# Patient Record
Sex: MALE
[De-identification: names, ages, dates, MRNs, and addresses within clinical notes are randomized per-mention and may not be internally consistent; named-entity substitution may affect disease eponyms.]

---

## 2018-05-12 ENCOUNTER — HOSPITAL ENCOUNTER (EMERGENCY)
Dept: HOSPITAL 88 - ER | Age: 53
Discharge: HOME | End: 2018-05-12
Payer: COMMERCIAL

## 2018-05-12 VITALS — SYSTOLIC BLOOD PRESSURE: 140 MMHG | DIASTOLIC BLOOD PRESSURE: 95 MMHG

## 2018-05-12 VITALS — BODY MASS INDEX: 27 KG/M2 | HEIGHT: 66 IN | WEIGHT: 168 LBS

## 2018-05-12 DIAGNOSIS — N20.1: ICD-10-CM

## 2018-05-12 DIAGNOSIS — R10.32: Primary | ICD-10-CM

## 2018-05-12 DIAGNOSIS — R11.0: ICD-10-CM

## 2018-05-12 LAB
ALBUMIN SERPL-MCNC: 4 G/DL (ref 3.5–5)
ALBUMIN/GLOB SERPL: 1.1 {RATIO} (ref 0.8–2)
ALP SERPL-CCNC: 83 IU/L (ref 40–150)
ALT SERPL-CCNC: 30 IU/L (ref 0–55)
ANION GAP SERPL CALC-SCNC: 11.2 MMOL/L (ref 8–16)
BACTERIA URNS QL MICRO: (no result) /HPF
BASOPHILS # BLD AUTO: 0 10*3/UL (ref 0–0.1)
BASOPHILS NFR BLD AUTO: 0.2 % (ref 0–1)
BILIRUB UR QL: NEGATIVE
BUN SERPL-MCNC: 26 MG/DL (ref 7–26)
BUN/CREAT SERPL: 17 (ref 6–25)
CALCIUM SERPL-MCNC: 9.8 MG/DL (ref 8.4–10.2)
CHLORIDE SERPL-SCNC: 101 MMOL/L (ref 98–107)
CLARITY UR: (no result)
CO2 SERPL-SCNC: 30 MMOL/L (ref 22–29)
COLOR UR: YELLOW
DEPRECATED NEUTROPHILS # BLD AUTO: 5 10*3/UL (ref 2.1–6.9)
EGFRCR SERPLBLD CKD-EPI 2021: 49 ML/MIN (ref 60–?)
EOSINOPHIL # BLD AUTO: 0.1 10*3/UL (ref 0–0.4)
EOSINOPHIL NFR BLD AUTO: 0.9 % (ref 0–6)
EPI CELLS URNS QL MICRO: (no result) /LPF
ERYTHROCYTE [DISTWIDTH] IN CORD BLOOD: 13.7 % (ref 11.7–14.4)
GLOBULIN PLAS-MCNC: 3.5 G/DL (ref 2.3–3.5)
GLUCOSE SERPLBLD-MCNC: 145 MG/DL (ref 74–118)
HCT VFR BLD AUTO: 45.1 % (ref 38.2–49.6)
HGB BLD-MCNC: 15.1 G/DL (ref 14–18)
KETONES UR QL STRIP.AUTO: NEGATIVE
LEUKOCYTE ESTERASE UR QL STRIP.AUTO: NEGATIVE
LYMPHOCYTES # BLD: 2.9 10*3/UL (ref 1–3.2)
LYMPHOCYTES NFR BLD AUTO: 34.1 % (ref 18–39.1)
MCH RBC QN AUTO: 30.9 PG (ref 28–32)
MCHC RBC AUTO-ENTMCNC: 33.5 G/DL (ref 31–35)
MCV RBC AUTO: 92.4 FL (ref 81–99)
MONOCYTES # BLD AUTO: 0.5 10*3/UL (ref 0.2–0.8)
MONOCYTES NFR BLD AUTO: 6.3 % (ref 4.4–11.3)
NEUTS SEG NFR BLD AUTO: 58.2 % (ref 38.7–80)
NITRITE UR QL STRIP.AUTO: NEGATIVE
PLATELET # BLD AUTO: 123 X10E3/UL (ref 140–360)
POTASSIUM SERPL-SCNC: 4.2 MMOL/L (ref 3.5–5.1)
PROT UR QL STRIP.AUTO: (no result)
RBC # BLD AUTO: 4.88 X10E6/UL (ref 4.3–5.7)
SODIUM SERPL-SCNC: 138 MMOL/L (ref 136–145)
SP GR UR STRIP: 1.03 (ref 1.01–1.02)
UROBILINOGEN UR STRIP-MCNC: 0.2 MG/DL (ref 0.2–1)
WBC #/AREA URNS HPF: (no result) /HPF (ref 0–5)

## 2018-05-12 PROCEDURE — 99284 EMERGENCY DEPT VISIT MOD MDM: CPT

## 2018-05-12 PROCEDURE — 87086 URINE CULTURE/COLONY COUNT: CPT

## 2018-05-12 PROCEDURE — 36415 COLL VENOUS BLD VENIPUNCTURE: CPT

## 2018-05-12 PROCEDURE — 85025 COMPLETE CBC W/AUTO DIFF WBC: CPT

## 2018-05-12 PROCEDURE — 74176 CT ABD & PELVIS W/O CONTRAST: CPT

## 2018-05-12 PROCEDURE — 80053 COMPREHEN METABOLIC PANEL: CPT

## 2018-05-12 PROCEDURE — 81001 URINALYSIS AUTO W/SCOPE: CPT

## 2018-05-14 ENCOUNTER — HOSPITAL ENCOUNTER (EMERGENCY)
Dept: HOSPITAL 88 - ER | Age: 53
Discharge: HOME | End: 2018-05-14
Payer: COMMERCIAL

## 2018-05-14 VITALS — BODY MASS INDEX: 27 KG/M2 | HEIGHT: 66 IN | WEIGHT: 168 LBS

## 2018-05-14 DIAGNOSIS — R10.9: Primary | ICD-10-CM

## 2018-05-14 DIAGNOSIS — J98.4: ICD-10-CM

## 2018-05-14 DIAGNOSIS — N20.1: ICD-10-CM

## 2018-05-14 DIAGNOSIS — R11.0: ICD-10-CM

## 2018-05-14 LAB
ALBUMIN SERPL-MCNC: 3.8 G/DL (ref 3.5–5)
ALBUMIN/GLOB SERPL: 1 {RATIO} (ref 0.8–2)
ALP SERPL-CCNC: 73 IU/L (ref 40–150)
ALT SERPL-CCNC: 21 IU/L (ref 0–55)
ANION GAP SERPL CALC-SCNC: 11.9 MMOL/L (ref 8–16)
BACTERIA URNS QL MICRO: (no result) /HPF
BASOPHILS # BLD AUTO: 0 10*3/UL (ref 0–0.1)
BASOPHILS NFR BLD AUTO: 0.1 % (ref 0–1)
BILIRUB UR QL: NEGATIVE
BUN SERPL-MCNC: 19 MG/DL (ref 7–26)
BUN/CREAT SERPL: 10 (ref 6–25)
CALCIUM SERPL-MCNC: 9.1 MG/DL (ref 8.4–10.2)
CHLORIDE SERPL-SCNC: 92 MMOL/L (ref 98–107)
CLARITY UR: CLEAR
CO2 SERPL-SCNC: 27 MMOL/L (ref 22–29)
COLOR UR: YELLOW
DEPRECATED NEUTROPHILS # BLD AUTO: 8.5 10*3/UL (ref 2.1–6.9)
DEPRECATED RBC URNS MANUAL-ACNC: (no result) /HPF (ref 0–5)
EGFRCR SERPLBLD CKD-EPI 2021: 39 ML/MIN (ref 60–?)
EOSINOPHIL # BLD AUTO: 0 10*3/UL (ref 0–0.4)
EOSINOPHIL NFR BLD AUTO: 0 % (ref 0–6)
EPI CELLS URNS QL MICRO: (no result) /LPF
ERYTHROCYTE [DISTWIDTH] IN CORD BLOOD: 13.5 % (ref 11.7–14.4)
GLOBULIN PLAS-MCNC: 3.8 G/DL (ref 2.3–3.5)
GLUCOSE SERPLBLD-MCNC: 127 MG/DL (ref 74–118)
HCT VFR BLD AUTO: 41.8 % (ref 38.2–49.6)
HGB BLD-MCNC: 14.4 G/DL (ref 14–18)
KETONES UR QL STRIP.AUTO: NEGATIVE
LEUKOCYTE ESTERASE UR QL STRIP.AUTO: NEGATIVE
LYMPHOCYTES # BLD: 0.5 10*3/UL (ref 1–3.2)
LYMPHOCYTES NFR BLD AUTO: 5.3 % (ref 18–39.1)
LYMPHOCYTES NFR BLD MANUAL: 3 % (ref 19–48)
MCH RBC QN AUTO: 31.2 PG (ref 28–32)
MCHC RBC AUTO-ENTMCNC: 34.4 G/DL (ref 31–35)
MCV RBC AUTO: 90.7 FL (ref 81–99)
MONOCYTES # BLD AUTO: 0.6 10*3/UL (ref 0.2–0.8)
MONOCYTES NFR BLD AUTO: 6.3 % (ref 4.4–11.3)
MONOCYTES NFR BLD MANUAL: 5 % (ref 3.4–9)
MUCOUS THREADS URNS QL MICRO: (no result)
NEUTS SEG NFR BLD AUTO: 87.8 % (ref 38.7–80)
NEUTS SEG NFR BLD MANUAL: 90 % (ref 40–74)
NITRITE UR QL STRIP.AUTO: NEGATIVE
PLAT MORPH BLD: (no result)
PLATELET # BLD AUTO: 103 X10E3/UL (ref 140–360)
PLATELET # BLD EST: (no result) 10*3/UL
POTASSIUM SERPL-SCNC: 3.9 MMOL/L (ref 3.5–5.1)
PROT UR QL STRIP.AUTO: NEGATIVE
RBC # BLD AUTO: 4.61 X10E6/UL (ref 4.3–5.7)
RBC MORPH BLD: NORMAL
SODIUM SERPL-SCNC: 127 MMOL/L (ref 136–145)
SP GR UR STRIP: 1.02 (ref 1.01–1.02)
UROBILINOGEN UR STRIP-MCNC: 0.2 MG/DL (ref 0.2–1)
WBC #/AREA URNS HPF: (no result) /HPF (ref 0–5)

## 2018-05-14 PROCEDURE — 81001 URINALYSIS AUTO W/SCOPE: CPT

## 2018-05-14 PROCEDURE — 99284 EMERGENCY DEPT VISIT MOD MDM: CPT

## 2018-05-14 PROCEDURE — 80053 COMPREHEN METABOLIC PANEL: CPT

## 2018-05-14 PROCEDURE — 36415 COLL VENOUS BLD VENIPUNCTURE: CPT

## 2018-05-14 PROCEDURE — 85025 COMPLETE CBC W/AUTO DIFF WBC: CPT

## 2018-05-14 NOTE — XMS REPORT
Patient Summary Document

 Created on: 2018



ANDERSON GUNN

External Reference #: [id number]

: 1965

Sex: Male



Demographics







 Address  [address] [place] [address]

 

 Home Phone  [phone]

 

 Preferred Language  Unknown

 

 Marital Status  Unknown

 

 Sikhism Affiliation  Unknown

 

 Race  Unknown

 

 Ethnic Group  Unknown





Author







 Author  [facility]

 

 Address  Unknown

 

 Phone  Unavailable







Care Team Providers







 Care Team Member Name  Role  Phone

 

 NIRMALA PARDO  Unavailable  Unavailable







Problems

This patient has no known problems.



Allergies, Adverse Reactions, Alerts

This patient has no known allergies or adverse reactions.



Medications

This patient has no known medications.



Results







 Test Description  Test Time  Test Comments  Text Results  Atomic Results  
Result Comments









 CT ABDOMEN/PELVIS [place] [address]      Patient Name: ANDERSON GUNN   MR #: [id number]    : 1965 Age/Sex: 53/M  Acct #: 
[id number] Req #: [id number]  Adm Physician:     Ordered by: ERIC WELLS 
NP  Report #: [id number]   Location: ER  Room/Bed:     __________________________
_________________________________________________________________________    
Procedure: [id number] CT/CT ABDOMEN/PELVIS WO  Exam Date: 18              
              Exam Time: 1137       REPORT STATUS: Signed    EXAM: CT Abdomen 
and Pelvis WITH contrast     INDICATION: Left flank pain for one hour, nausea   
COMPARISON: None.   TECHNIQUE: Abdomen and pelvis were scanned utilizing a 
multidetector helical   scanner from the lung base to the pubic symphysis after 
administration of IV   contrast. Coronal and sagittal reformations were 
obtained.  Routine protocol is   performed.               IV CONTRAST: None.   
            ORAL CONTRAST: Water               RADIATION DOSE: Total DLP: 
398.35 mGy*cm                Estimated effective dose: (DLP x 0.015 x size 
factor) mSv               COMPLICATIONS: None      FINDINGS:      LINES and 
TUBES: None.      LOWER THORAX:  Unremarkable      HEPATOBILIARY:      No focal 
hepatic lesions. No biliary ductal dilation.       GALLBLADDER: No radio-opaque 
stones or sludge.  No wall thickening.      SPLEEN: No splenomegaly.       
PANCREAS: No focal masses or ductal dilatation.        ADRENALS: No adrenal 
nodules          KIDNEYS/URETERS:      No cystic or solid mass lesions.        
3 mm stone in the left distal ureter (series 3, image 134) with mild left   
hydroureter. No hydronephrosis.      No other renal or ureteral stones on 
either side.      GI TRACT: No abnormal distention, wall thickening, or 
evidence of bowel   obstruction.  Small hiatal hernia.  Sigmoid diverticulosis 
without evidence of   acute diverticulitis.      PELVIC ORGANS/BLADDER: 
Unremarkable.      LYMPH NODES: No lymphadenopathy.      VESSELS: Unremarkable.
      PERITONEUM / RETROPERITONEUM: No free air or fluid. There is fatty 
stranding at   the root of the mesentery with a few prominent mesenteric lymph 
nodes.      BONES: Unremarkable.      SOFT TISSUES: Unremarkable.              
    IMPRESSION:    1.  3 mm stone in the left distal ureter with mild left 
hydroureter.      2.  Fatty stranding at the root of the mesentery ("brayan 
mesentery"   appearance). This is nonspecific and may be due to inflammatory 
conditions   (such as retractile mesenteritis) or lymphoproliferative diseases. 
The clinical   significance is unclear.      Consider follow-up CT of the 
abdomen in 6 months to ensure stability.         Imelda Beltre MD      Signed 
by: Dr. Imelda Beltre M.D. on 2018 11:55 AM        Dictated By: IMELDA BELTRE MD  Electronically Signed By: IMELDA BELTRE MD on 18 4203  
Transcribed By: EDWAR on 18 5882       COPY TO:   ERIC WELLS NP

## 2018-05-14 NOTE — XMS REPORT
Continuity of Care Document

 Created on: 2018



ANDERSON ADAMES

External Reference #: [id number]

: 1965

Sex: Male



Demographics







 [place]  [address]

 

 Home Phone  [phone]

 

 Preferred Language  Unknown

 

 Marital Status  Unknown

 

 Judaism Affiliation  Unknown

 

 Race  Other

 

 Ethnic Group  Unknown





Author







 Author  [facility]

 

 Organization  [facility]

 

 Address  [address]

[place]  [address]



 

 Phone  Unavailable







Support







 Name  Relationship  Address  Phone

 

 NIRMALA PARDO MD  Caregiver  [address]

[place]  [address]  Unavailable

 

 NO, PCP  Caregiver  Unknown  Unavailable

 

 MD ADRIA  Caregiver  Unknown  Unavailable

 

 CAMILLA ADAMES  Next Of Kin   [place], [place]  [id number] [phone]







Care Team Providers







 Care Team Member Name  Role  Phone

 

 NO, PCP  PCP  Unavailable







Insurance Providers







 Guarantor  Anderson Adames

 

 Address   [place] [id number]

 

 Phone  [phone]

 

 Email  [email or web address]











 Payer  [employer]

 

 Policy Number  [id number]

 

 Subscriber's Name  Anderson Adames

 

 Relationship  18 Self / Same As Patient

 

 Group Number  [id number]

 

 Group Name  [employer]

 

 Effective Date  11







Advance Directives







 Directive  Response  Recorded Date/Time

 

 Does the patient have an advance directive?  No  13 10:48pm

 

 If yes, is advance directive on file with [facility]?  No  13 10:48pm

 

 If not on file with [facility] will patient provide a copy?  No  13 10:48pm

 

 Do you have a Directive to Physician?  No  18 11:36am

 

 Do you have a Medical Power of ?  No  18 11:36am

 

 Do you have an out of hospital Do Not Resuscitate Order?  No  18 11:36am

 

 Do you have any special needs we should be aware of?  No  18 11:36am

 

 Do you have a support person here with you today?  Yes  18 11:36am

 

 Did patient receive Notice of Privacy Practices?  Yes  18 11:36am

 

 Did patient receive patient rights and responsibilities?  Yes  18 11:36am







Problems

No problem information available.



Medications





Current Home Medications





 Medication  Dose  Units  Route  Directions  Days  Qty  Instructions  Start Date

 

 Atorvastatin Calcium (Lipitor) 20 Mg Tablet  1  Tab  Oral  Rt Daily            







Social History







 Smoking Status  Start Date  Stop Date

 

 Never Smoker      







Hospital Discharge Instructions

No hospital discharge instruction information available.



Plan of Care







 Discharge Date  18 2:12pm

 

 Disposition  HOME, SELF-CARE

 

 Condition at Discharge  Stable

 

 Instructions/Education Provided  Kidney Stones

 

 Forms Provided  Work/School Excuse

 

 Prescriptions  See Medication Section

 

 Referrals  ENRIQUE GILES MD

Address:

 [facility] [place] [address] [phone]

 

 Additional Instructions/Education  1. increase oral fluids



 2. return to ed as needed



 3. follow up with urologist in 1-2 days without fail









Functional Status

No functional status information available.



Allergies, Adverse Reactions, Alerts

No known allergies.



Immunizations

No immunization information available.



Vital Signs





Acute Vital Signs





 Vital  Response  Date/Time

 

 Pulse      

 

    Pulse Rate (adult)  65 bpm (60 - 90)  2018 2:10pm

 

 Respiratory Rate  20 bpm (12 - 24)  2018 2:10pm

 

 Blood Pressure  140/95 mm Hg  2018 2:10pm

 

 Height  5 ft 6 in  2018 11:10am

 

 Weight  168 lb  2018 11:10am

 

 Body Mass Index  27.1 kg/m^2  2018 11:10am







Results





Laboratory Results





 Test Name  Result  Units  Flags  Reference  Collection Date/Time  Result Date/
Time  Comments

 

 White Blood Count  8.58  x10e3/uL     4.8-10.8  2018 11:45am  2018 
11:57am   

 

 Red Blood Count  4.88  x10e6/uL     4.3-5.7  2018 11:45am  2018 11:
57am   

 

 Hemoglobin  15.1  g/dL     14.0-18.0  2018 11:45am  2018 11:57am   

 

 Hematocrit  45.1  %     38.2-49.6  2018 11:45am  2018 11:57am   

 

 Mean Corpuscular Volume  92.4  fL     81-99  2018 11:45am  2018 11:
57am   

 

 Mean Corpuscular Hemoglobin  30.9  pg     28-32  2018 11:452018 11:57am   

 

 Mean Corpuscular Hemoglobin Concent  33.5  g/dL     31-35  2018 11:452018 11:57am   

 

 Red Cell Distribution Width  13.7  %     11.7-14.4  2018 11:452018 11:57am   

 

 Platelet Count  123  x10e3/uL   L  140-360  2018 11:452018 11:
57am   

 

 Neutrophils (%) (Auto)  58.2  %     38.7-80.0  2018 11:45am  2018 
11:57am   

 

 Lymphocytes (%) (Auto)  34.1  %     18.0-39.1  2018 11:452018 
11:57am   

 

 Monocytes (%) (Auto)  6.3   %     4.4-11.3  2018 11:452018 11:
57am   

 

 Eosinophils (%) (Auto)  0.9  %     0.0-6.0  2018 11:452018 11:
57am   

 

 Basophils (%) (Auto)  0.2  %     0.0-1.0  2018 11:452018 11:
57am   

 

 IM GRANULOCYTES %  0.3  %     0.0-1.0  2018 11:452018 11:57am 
  

 

 Neutrophils # (Auto)  5.0        2.1-6.9  2018 11:452018 11:
57am   

 

 Lymphocytes # (Auto)  2.9        1.0-3.2  2018 11:45am  2018 11:
57am   

 

 Monocytes # (Auto)  0.5        0.2-0.8  2018 11:452018 11:57am
   

 

 Eosinophils # (Auto)  0.1        0.0-0.4  2018 11:452018 11:
57am   

 

 Basophils # (Auto)  0.0        0.0-0.1  2018 11:45am  2018 11:57am
   

 

 Absolute Immature Granulocyte (auto  0.03  x10e3/uL     0-0.1  2018 11:
45am  2018 11:57am   

 

 Urine Color  YELLOW        YELLOW  2018 11:12am  2018 12:02pm   

 

 Urine Clarity  HAZY        CLEAR  2018 11:122018 12:02pm   

 

 Urine Specific Gravity  1.030      H  1.010-1.025  2018 11:12am  2018 12:02pm   

 

 Urine pH  6        5 - 7  2018 11:122018 12:02pm   

 

 Urine Leukocyte Esterase  NEGATIVE        NEGATIVE  2018 11:12am  2018 12:02pm   

 

 Urine Nitrite  NEGATIVE        NEGATIVE  2018 11:12am  2018 12:
02pm   

 

 Urine Protein  TRACE      H  NEGATIVE  2018 11:12am  2018 12:02pm 
  

 

 Urine Glucose (UA)  NEGATIVE        NEGATIVE  2018 11:12am  2018 12
:02pm   

 

 Urine Ketones  NEGATIVE        NEGATIVE  2018 11:12am  2018 12:
02pm   

 

 Urine Urobilinogen  0.2  mg/dL     0.2 - 1  2018 11:12am  2018 12:
02pm   

 

 Urine Bilirubin  NEGATIVE        NEGATIVE  2018 11:12am  2018 12:
02pm   

 

 Urine Blood  3+      H  NEGATIVE  2018 11:12am  2018 12:02pm   

 

 Urine WBC  0-5  /HPF     0-5  2018 11:12am  2018 12:12pm   

 

 Urine RBC  11-20  /HPF   H  0-5  2018 11:12am  2018 12:12pm   

 

 Urine Bacteria  FEW  /HPF     NONE  2018 11:12am  2018 12:12pm   

 

 Urine Epithelial Cells  FEW  /LPF     NONE  2018 11:12am  2018 12:
12pm   

 

 Sodium Level  138  mmol/L     136-145  2018 11:45am  2018 12:15pm 
  

 

 Potassium Level  4.2  mmol/L     3.5-5.1  2018 11:45am  2018 12:
15pm   

 

 Chloride Level  101  mmol/L       2018 11:452018 12:15pm
   

 

 Carbon Dioxide Level  30  mmol/L   H  22-29  2018 11:452018 12:
15pm   

 

 Anion Gap  11.2  mmol/L     8-16  2018 11:452018 12:15pm   

 

 Blood Urea Nitrogen  26  mg/dL     7-26  2018 11:452018 12:
15pm   

 

 Creatinine  1.50  mg/dL   H  0.72-1.25  2018 11:452018 12:15pm
   

 

 BUN/Creatinine Ratio  17        6-25  2018 11:452018 12:15pm   

 

 Estimat Glomerular Filtration Rate  49  ML/MIN   L  60-  2018 11:45 12:15pm  Ranges were taken from the National Kidney Disease Education 

Program and the National Kid[employer] Foundation literature.







Reference ranges:



 60 or greater: Normal



 16-59 (for 3 consecutive months): Chronic kidney disease 



 15 or less: Kidney failure

 

 Glucose Level  145  mg/dL   H    2018 11:452018 12:15pm 
  

 

 Calcium Level  9.8  mg/dL     8.4-10.2  2018 11:452018 12:15pm
   

 

 Total Bilirubin  0.5  mg/dL     0.2-1.2  2018 11:452018 12:
15pm   

 

 Aspartate Amino Transf (AST/SGOT)  17  IU/L     5-34  2018 11:45 12:15pm   

 

 Alanine Aminotransferase (ALT/SGPT)  30  IU/L     0-55  2018 11:452018 12:15pm   

 

 Total Protein  7.5  g/dL     6.5-8.1  2018 11:452018 12:15pm   

 

 Albumin  4.0  g/dL     3.5-5.0  2018 11:452018 12:15pm   

 

 Globulin  3.5  g/dL     2.3-3.5  2018 11:452018 12:15pm   

 

 Albumin/Globulin Ratio  1.1        0.8-2.0  2018 11:45am  2018 12:
15pm   

 

 Alkaline Phosphatase  83  IU/L       2018 11:45am  2018 12:
15pm   







Procedures







 Procedure  Status  Date  Provider(s)

 

 CT of abdomen and pelvis without contrast  Active  18  ERIC WELLS NP







Encounters







 Encounter  Location  Arrival/Admit Date  Discharge/Depart Date  Attending 
Provider

 

 Departed Emergency Room  [facility]  18 11:07am   2:12pm  NIRMALA PARDO MD